# Patient Record
Sex: FEMALE | Race: WHITE | NOT HISPANIC OR LATINO | URBAN - METROPOLITAN AREA
[De-identification: names, ages, dates, MRNs, and addresses within clinical notes are randomized per-mention and may not be internally consistent; named-entity substitution may affect disease eponyms.]

---
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
## 2020-01-01 PROCEDURE — 99232 SBSQ HOSP IP/OBS MODERATE 35: CPT

## 2020-01-01 RX ADMIN — Medication 1 MILLIGRAM(S): at 21:44

## 2020-01-01 RX ADMIN — OLANZAPINE 10 MILLIGRAM(S): 15 TABLET, FILM COATED ORAL at 08:22

## 2020-01-01 RX ADMIN — Medication 10 MILLIGRAM(S): at 08:22

## 2020-01-01 RX ADMIN — METFORMIN HYDROCHLORIDE 1000 MILLIGRAM(S): 850 TABLET ORAL at 21:44

## 2020-01-01 RX ADMIN — OLANZAPINE 10 MILLIGRAM(S): 15 TABLET, FILM COATED ORAL at 21:44

## 2020-01-01 RX ADMIN — Medication 1 MILLIGRAM(S): at 08:22

## 2020-01-01 RX ADMIN — Medication 10 MILLIGRAM(S): at 21:44

## 2020-01-01 RX ADMIN — METFORMIN HYDROCHLORIDE 1000 MILLIGRAM(S): 850 TABLET ORAL at 08:22

## 2020-01-02 PROCEDURE — 99231 SBSQ HOSP IP/OBS SF/LOW 25: CPT

## 2020-01-02 RX ORDER — ACETAMINOPHEN 500 MG
650 TABLET ORAL EVERY 6 HOURS
Refills: 0 | Status: DISCONTINUED | OUTPATIENT
Start: 2020-01-02 | End: 2020-01-21

## 2020-01-02 RX ADMIN — Medication 1 MILLIGRAM(S): at 08:36

## 2020-01-02 RX ADMIN — FLUPHENAZINE HYDROCHLORIDE 5 MILLIGRAM(S): 1 TABLET, FILM COATED ORAL at 09:05

## 2020-01-02 RX ADMIN — Medication 650 MILLIGRAM(S): at 09:34

## 2020-01-02 RX ADMIN — Medication 3 MILLIGRAM(S): at 21:18

## 2020-01-02 RX ADMIN — METFORMIN HYDROCHLORIDE 1000 MILLIGRAM(S): 850 TABLET ORAL at 08:36

## 2020-01-02 RX ADMIN — Medication 2 MILLIGRAM(S): at 09:05

## 2020-01-02 RX ADMIN — Medication 10 MILLIGRAM(S): at 20:45

## 2020-01-02 RX ADMIN — Medication 10 MILLIGRAM(S): at 08:36

## 2020-01-02 RX ADMIN — METFORMIN HYDROCHLORIDE 1000 MILLIGRAM(S): 850 TABLET ORAL at 20:45

## 2020-01-02 RX ADMIN — Medication 650 MILLIGRAM(S): at 11:36

## 2020-01-02 RX ADMIN — Medication 1 MILLIGRAM(S): at 20:45

## 2020-01-02 RX ADMIN — OLANZAPINE 10 MILLIGRAM(S): 15 TABLET, FILM COATED ORAL at 08:36

## 2020-01-03 PROCEDURE — 99232 SBSQ HOSP IP/OBS MODERATE 35: CPT

## 2020-01-03 RX ORDER — FLUPHENAZINE HYDROCHLORIDE 1 MG/1
5 TABLET, FILM COATED ORAL EVERY 6 HOURS
Refills: 0 | Status: DISCONTINUED | OUTPATIENT
Start: 2020-01-03 | End: 2020-01-21

## 2020-01-03 RX ORDER — LOXAPINE SUCCINATE 25 MG
10 CAPSULE ORAL THREE TIMES A DAY
Refills: 0 | Status: DISCONTINUED | OUTPATIENT
Start: 2020-01-03 | End: 2020-01-03

## 2020-01-03 RX ORDER — LOXAPINE SUCCINATE 25 MG
10 CAPSULE ORAL THREE TIMES A DAY
Refills: 0 | Status: DISCONTINUED | OUTPATIENT
Start: 2020-01-03 | End: 2020-01-06

## 2020-01-03 RX ORDER — OLANZAPINE 15 MG/1
10 TABLET, FILM COATED ORAL AT BEDTIME
Refills: 0 | Status: DISCONTINUED | OUTPATIENT
Start: 2020-01-03 | End: 2020-01-08

## 2020-01-03 RX ORDER — DIPHENHYDRAMINE HCL 50 MG
50 CAPSULE ORAL EVERY 6 HOURS
Refills: 0 | Status: DISCONTINUED | OUTPATIENT
Start: 2020-01-03 | End: 2020-01-21

## 2020-01-03 RX ADMIN — Medication 2 MILLIGRAM(S): at 15:38

## 2020-01-03 RX ADMIN — Medication 1 MILLIGRAM(S): at 08:41

## 2020-01-03 RX ADMIN — Medication 10 MILLIGRAM(S): at 20:24

## 2020-01-03 RX ADMIN — Medication 10 MILLIGRAM(S): at 09:26

## 2020-01-03 RX ADMIN — Medication 2 MILLIGRAM(S): at 15:54

## 2020-01-03 RX ADMIN — Medication 1 MILLIGRAM(S): at 20:16

## 2020-01-03 RX ADMIN — Medication 2 MILLIGRAM(S): at 10:03

## 2020-01-03 RX ADMIN — METFORMIN HYDROCHLORIDE 1000 MILLIGRAM(S): 850 TABLET ORAL at 08:41

## 2020-01-03 RX ADMIN — FLUPHENAZINE HYDROCHLORIDE 5 MILLIGRAM(S): 1 TABLET, FILM COATED ORAL at 15:54

## 2020-01-03 RX ADMIN — Medication 50 MILLIGRAM(S): at 10:02

## 2020-01-03 RX ADMIN — OLANZAPINE 10 MILLIGRAM(S): 15 TABLET, FILM COATED ORAL at 09:26

## 2020-01-03 RX ADMIN — FLUPHENAZINE HYDROCHLORIDE 5 MILLIGRAM(S): 1 TABLET, FILM COATED ORAL at 10:02

## 2020-01-03 RX ADMIN — Medication 50 MILLIGRAM(S): at 15:54

## 2020-01-03 RX ADMIN — METFORMIN HYDROCHLORIDE 1000 MILLIGRAM(S): 850 TABLET ORAL at 20:24

## 2020-01-04 RX ADMIN — Medication 50 MILLIGRAM(S): at 09:59

## 2020-01-04 RX ADMIN — METFORMIN HYDROCHLORIDE 1000 MILLIGRAM(S): 850 TABLET ORAL at 08:05

## 2020-01-04 RX ADMIN — Medication 1 MILLIGRAM(S): at 20:45

## 2020-01-04 RX ADMIN — Medication 10 MILLIGRAM(S): at 13:29

## 2020-01-04 RX ADMIN — Medication 10 MILLIGRAM(S): at 20:45

## 2020-01-04 RX ADMIN — Medication 10 MILLIGRAM(S): at 09:47

## 2020-01-04 RX ADMIN — Medication 2 MILLIGRAM(S): at 13:29

## 2020-01-04 RX ADMIN — OLANZAPINE 10 MILLIGRAM(S): 15 TABLET, FILM COATED ORAL at 20:45

## 2020-01-04 RX ADMIN — Medication 1 MILLIGRAM(S): at 08:05

## 2020-01-04 RX ADMIN — METFORMIN HYDROCHLORIDE 1000 MILLIGRAM(S): 850 TABLET ORAL at 20:45

## 2020-01-04 RX ADMIN — Medication 2 MILLIGRAM(S): at 09:59

## 2020-01-04 RX ADMIN — FLUPHENAZINE HYDROCHLORIDE 5 MILLIGRAM(S): 1 TABLET, FILM COATED ORAL at 09:59

## 2020-01-05 RX ADMIN — Medication 10 MILLIGRAM(S): at 09:14

## 2020-01-05 RX ADMIN — Medication 1 MILLIGRAM(S): at 20:49

## 2020-01-05 RX ADMIN — METFORMIN HYDROCHLORIDE 1000 MILLIGRAM(S): 850 TABLET ORAL at 09:14

## 2020-01-05 RX ADMIN — OLANZAPINE 10 MILLIGRAM(S): 15 TABLET, FILM COATED ORAL at 20:49

## 2020-01-05 RX ADMIN — Medication 2 MILLIGRAM(S): at 18:02

## 2020-01-05 RX ADMIN — Medication 10 MILLIGRAM(S): at 12:10

## 2020-01-05 RX ADMIN — Medication 10 MILLIGRAM(S): at 20:49

## 2020-01-05 RX ADMIN — Medication 1 MILLIGRAM(S): at 09:14

## 2020-01-05 RX ADMIN — Medication 2 MILLIGRAM(S): at 12:10

## 2020-01-05 RX ADMIN — METFORMIN HYDROCHLORIDE 1000 MILLIGRAM(S): 850 TABLET ORAL at 20:49

## 2020-01-06 PROCEDURE — 99231 SBSQ HOSP IP/OBS SF/LOW 25: CPT

## 2020-01-06 RX ORDER — LOXAPINE SUCCINATE 25 MG
10 CAPSULE ORAL
Refills: 0 | Status: DISCONTINUED | OUTPATIENT
Start: 2020-01-06 | End: 2020-01-10

## 2020-01-06 RX ORDER — LOXAPINE SUCCINATE 25 MG
20 CAPSULE ORAL DAILY
Refills: 0 | Status: DISCONTINUED | OUTPATIENT
Start: 2020-01-07 | End: 2020-01-10

## 2020-01-06 RX ADMIN — Medication 10 MILLIGRAM(S): at 20:30

## 2020-01-06 RX ADMIN — Medication 1 MILLIGRAM(S): at 08:35

## 2020-01-06 RX ADMIN — METFORMIN HYDROCHLORIDE 1000 MILLIGRAM(S): 850 TABLET ORAL at 08:35

## 2020-01-06 RX ADMIN — METFORMIN HYDROCHLORIDE 1000 MILLIGRAM(S): 850 TABLET ORAL at 20:30

## 2020-01-06 RX ADMIN — Medication 2 MILLIGRAM(S): at 12:00

## 2020-01-06 RX ADMIN — Medication 3 MILLIGRAM(S): at 20:50

## 2020-01-06 RX ADMIN — Medication 10 MILLIGRAM(S): at 08:35

## 2020-01-06 RX ADMIN — Medication 10 MILLIGRAM(S): at 12:00

## 2020-01-06 RX ADMIN — OLANZAPINE 10 MILLIGRAM(S): 15 TABLET, FILM COATED ORAL at 20:30

## 2020-01-06 RX ADMIN — Medication 1 MILLIGRAM(S): at 20:30

## 2020-01-07 PROCEDURE — 99232 SBSQ HOSP IP/OBS MODERATE 35: CPT

## 2020-01-07 RX ORDER — BENZTROPINE MESYLATE 1 MG
0.5 TABLET ORAL ONCE
Refills: 0 | Status: COMPLETED | OUTPATIENT
Start: 2020-01-07 | End: 2020-01-07

## 2020-01-07 RX ADMIN — Medication 1 MILLIGRAM(S): at 09:19

## 2020-01-07 RX ADMIN — METFORMIN HYDROCHLORIDE 1000 MILLIGRAM(S): 850 TABLET ORAL at 09:19

## 2020-01-07 RX ADMIN — OLANZAPINE 10 MILLIGRAM(S): 15 TABLET, FILM COATED ORAL at 20:41

## 2020-01-07 RX ADMIN — Medication 10 MILLIGRAM(S): at 20:41

## 2020-01-07 RX ADMIN — Medication 2 MILLIGRAM(S): at 12:36

## 2020-01-07 RX ADMIN — Medication 10 MILLIGRAM(S): at 12:36

## 2020-01-07 RX ADMIN — Medication 400 MILLIGRAM(S): at 17:45

## 2020-01-07 RX ADMIN — Medication 400 MILLIGRAM(S): at 17:16

## 2020-01-07 RX ADMIN — Medication 20 MILLIGRAM(S): at 09:19

## 2020-01-07 RX ADMIN — Medication 0.5 MILLIGRAM(S): at 18:48

## 2020-01-07 RX ADMIN — METFORMIN HYDROCHLORIDE 1000 MILLIGRAM(S): 850 TABLET ORAL at 20:41

## 2020-01-07 RX ADMIN — Medication 1 MILLIGRAM(S): at 20:41

## 2020-01-07 RX ADMIN — Medication 3 MILLIGRAM(S): at 21:10

## 2020-01-08 PROCEDURE — 99231 SBSQ HOSP IP/OBS SF/LOW 25: CPT

## 2020-01-08 RX ORDER — OLANZAPINE 15 MG/1
5 TABLET, FILM COATED ORAL AT BEDTIME
Refills: 0 | Status: DISCONTINUED | OUTPATIENT
Start: 2020-01-08 | End: 2020-01-10

## 2020-01-08 RX ADMIN — Medication 1 MILLIGRAM(S): at 20:22

## 2020-01-08 RX ADMIN — Medication 10 MILLIGRAM(S): at 20:39

## 2020-01-08 RX ADMIN — Medication 2 MILLIGRAM(S): at 12:30

## 2020-01-08 RX ADMIN — Medication 50 MILLIGRAM(S): at 16:49

## 2020-01-08 RX ADMIN — METFORMIN HYDROCHLORIDE 1000 MILLIGRAM(S): 850 TABLET ORAL at 20:39

## 2020-01-08 RX ADMIN — Medication 1 MILLIGRAM(S): at 08:41

## 2020-01-08 RX ADMIN — Medication 2 MILLIGRAM(S): at 16:48

## 2020-01-08 RX ADMIN — Medication 10 MILLIGRAM(S): at 12:30

## 2020-01-08 RX ADMIN — METFORMIN HYDROCHLORIDE 1000 MILLIGRAM(S): 850 TABLET ORAL at 08:42

## 2020-01-08 RX ADMIN — OLANZAPINE 5 MILLIGRAM(S): 15 TABLET, FILM COATED ORAL at 20:39

## 2020-01-08 RX ADMIN — Medication 20 MILLIGRAM(S): at 08:42

## 2020-01-08 RX ADMIN — FLUPHENAZINE HYDROCHLORIDE 5 MILLIGRAM(S): 1 TABLET, FILM COATED ORAL at 16:48

## 2020-01-09 PROCEDURE — 99231 SBSQ HOSP IP/OBS SF/LOW 25: CPT

## 2020-01-09 RX ADMIN — Medication 20 MILLIGRAM(S): at 08:28

## 2020-01-09 RX ADMIN — Medication 10 MILLIGRAM(S): at 14:17

## 2020-01-09 RX ADMIN — Medication 50 MILLIGRAM(S): at 15:33

## 2020-01-09 RX ADMIN — Medication 1 MILLIGRAM(S): at 08:28

## 2020-01-09 RX ADMIN — FLUPHENAZINE HYDROCHLORIDE 5 MILLIGRAM(S): 1 TABLET, FILM COATED ORAL at 15:33

## 2020-01-09 RX ADMIN — Medication 10 MILLIGRAM(S): at 21:23

## 2020-01-09 RX ADMIN — OLANZAPINE 5 MILLIGRAM(S): 15 TABLET, FILM COATED ORAL at 21:23

## 2020-01-09 RX ADMIN — Medication 2 MILLIGRAM(S): at 14:17

## 2020-01-09 RX ADMIN — METFORMIN HYDROCHLORIDE 1000 MILLIGRAM(S): 850 TABLET ORAL at 08:28

## 2020-01-09 RX ADMIN — METFORMIN HYDROCHLORIDE 1000 MILLIGRAM(S): 850 TABLET ORAL at 21:23

## 2020-01-09 RX ADMIN — Medication 1 MILLIGRAM(S): at 21:23

## 2020-01-10 PROCEDURE — 99231 SBSQ HOSP IP/OBS SF/LOW 25: CPT

## 2020-01-10 RX ORDER — LOXAPINE SUCCINATE 25 MG
10 CAPSULE ORAL DAILY
Refills: 0 | Status: DISCONTINUED | OUTPATIENT
Start: 2020-01-10 | End: 2020-01-16

## 2020-01-10 RX ORDER — LOXAPINE SUCCINATE 25 MG
20 CAPSULE ORAL
Refills: 0 | Status: COMPLETED | OUTPATIENT
Start: 2020-01-10 | End: 2020-01-16

## 2020-01-10 RX ADMIN — Medication 10 MILLIGRAM(S): at 13:05

## 2020-01-10 RX ADMIN — METFORMIN HYDROCHLORIDE 1000 MILLIGRAM(S): 850 TABLET ORAL at 09:52

## 2020-01-10 RX ADMIN — Medication 2 MILLIGRAM(S): at 13:05

## 2020-01-10 RX ADMIN — Medication 20 MILLIGRAM(S): at 20:37

## 2020-01-10 RX ADMIN — Medication 1 MILLIGRAM(S): at 20:37

## 2020-01-10 RX ADMIN — Medication 20 MILLIGRAM(S): at 09:52

## 2020-01-10 RX ADMIN — METFORMIN HYDROCHLORIDE 1000 MILLIGRAM(S): 850 TABLET ORAL at 20:37

## 2020-01-10 RX ADMIN — Medication 1 MILLIGRAM(S): at 09:52

## 2020-01-11 RX ADMIN — Medication 3 MILLIGRAM(S): at 20:40

## 2020-01-11 RX ADMIN — Medication 3 MILLIGRAM(S): at 00:50

## 2020-01-11 RX ADMIN — Medication 10 MILLIGRAM(S): at 14:41

## 2020-01-11 RX ADMIN — Medication 2 MILLIGRAM(S): at 00:50

## 2020-01-11 RX ADMIN — FLUPHENAZINE HYDROCHLORIDE 5 MILLIGRAM(S): 1 TABLET, FILM COATED ORAL at 00:50

## 2020-01-11 RX ADMIN — Medication 20 MILLIGRAM(S): at 12:05

## 2020-01-11 RX ADMIN — METFORMIN HYDROCHLORIDE 1000 MILLIGRAM(S): 850 TABLET ORAL at 20:37

## 2020-01-11 RX ADMIN — Medication 20 MILLIGRAM(S): at 20:37

## 2020-01-11 RX ADMIN — Medication 2 MILLIGRAM(S): at 14:41

## 2020-01-11 RX ADMIN — METFORMIN HYDROCHLORIDE 1000 MILLIGRAM(S): 850 TABLET ORAL at 12:05

## 2020-01-11 RX ADMIN — Medication 1 MILLIGRAM(S): at 12:05

## 2020-01-11 RX ADMIN — Medication 1 MILLIGRAM(S): at 20:14

## 2020-01-12 RX ADMIN — Medication 10 MILLIGRAM(S): at 14:15

## 2020-01-12 RX ADMIN — METFORMIN HYDROCHLORIDE 1000 MILLIGRAM(S): 850 TABLET ORAL at 09:49

## 2020-01-12 RX ADMIN — Medication 20 MILLIGRAM(S): at 20:25

## 2020-01-12 RX ADMIN — Medication 20 MILLIGRAM(S): at 09:49

## 2020-01-12 RX ADMIN — Medication 50 MILLIGRAM(S): at 20:30

## 2020-01-12 RX ADMIN — Medication 1 MILLIGRAM(S): at 09:49

## 2020-01-12 RX ADMIN — Medication 2 MILLIGRAM(S): at 14:15

## 2020-01-12 RX ADMIN — Medication 3 MILLIGRAM(S): at 21:31

## 2020-01-12 RX ADMIN — Medication 2 MILLIGRAM(S): at 20:31

## 2020-01-12 RX ADMIN — METFORMIN HYDROCHLORIDE 1000 MILLIGRAM(S): 850 TABLET ORAL at 20:25

## 2020-01-12 RX ADMIN — Medication 1 MILLIGRAM(S): at 20:25

## 2020-01-13 PROCEDURE — 99231 SBSQ HOSP IP/OBS SF/LOW 25: CPT

## 2020-01-13 RX ORDER — BENZTROPINE MESYLATE 1 MG
0.5 TABLET ORAL
Refills: 0 | Status: DISCONTINUED | OUTPATIENT
Start: 2020-01-13 | End: 2020-01-21

## 2020-01-13 RX ORDER — DIPHENHYDRAMINE HCL 50 MG
50 CAPSULE ORAL ONCE
Refills: 0 | Status: COMPLETED | OUTPATIENT
Start: 2020-01-13 | End: 2020-01-13

## 2020-01-13 RX ADMIN — Medication 20 MILLIGRAM(S): at 08:27

## 2020-01-13 RX ADMIN — Medication 1 MILLIGRAM(S): at 20:28

## 2020-01-13 RX ADMIN — Medication 10 MILLIGRAM(S): at 12:45

## 2020-01-13 RX ADMIN — Medication 2 MILLIGRAM(S): at 12:45

## 2020-01-13 RX ADMIN — Medication 20 MILLIGRAM(S): at 20:28

## 2020-01-13 RX ADMIN — Medication 3 MILLIGRAM(S): at 20:29

## 2020-01-13 RX ADMIN — METFORMIN HYDROCHLORIDE 1000 MILLIGRAM(S): 850 TABLET ORAL at 08:27

## 2020-01-13 RX ADMIN — Medication 0.5 MILLIGRAM(S): at 20:28

## 2020-01-13 RX ADMIN — Medication 50 MILLIGRAM(S): at 23:43

## 2020-01-13 RX ADMIN — METFORMIN HYDROCHLORIDE 1000 MILLIGRAM(S): 850 TABLET ORAL at 20:28

## 2020-01-13 RX ADMIN — Medication 1 MILLIGRAM(S): at 08:27

## 2020-01-14 PROCEDURE — 99231 SBSQ HOSP IP/OBS SF/LOW 25: CPT

## 2020-01-14 RX ADMIN — Medication 2 MILLIGRAM(S): at 12:56

## 2020-01-14 RX ADMIN — Medication 0.5 MILLIGRAM(S): at 20:03

## 2020-01-14 RX ADMIN — Medication 400 MILLIGRAM(S): at 12:30

## 2020-01-14 RX ADMIN — METFORMIN HYDROCHLORIDE 1000 MILLIGRAM(S): 850 TABLET ORAL at 09:34

## 2020-01-14 RX ADMIN — Medication 1 MILLIGRAM(S): at 09:34

## 2020-01-14 RX ADMIN — Medication 0.5 MILLIGRAM(S): at 09:34

## 2020-01-14 RX ADMIN — Medication 1 MILLIGRAM(S): at 20:03

## 2020-01-14 RX ADMIN — Medication 400 MILLIGRAM(S): at 11:00

## 2020-01-14 RX ADMIN — Medication 20 MILLIGRAM(S): at 09:34

## 2020-01-14 RX ADMIN — METFORMIN HYDROCHLORIDE 1000 MILLIGRAM(S): 850 TABLET ORAL at 20:03

## 2020-01-14 RX ADMIN — Medication 3 MILLIGRAM(S): at 20:44

## 2020-01-14 RX ADMIN — Medication 10 MILLIGRAM(S): at 12:56

## 2020-01-14 RX ADMIN — Medication 20 MILLIGRAM(S): at 20:03

## 2020-01-15 PROCEDURE — 99231 SBSQ HOSP IP/OBS SF/LOW 25: CPT

## 2020-01-15 RX ADMIN — Medication 3 MILLIGRAM(S): at 21:05

## 2020-01-15 RX ADMIN — METFORMIN HYDROCHLORIDE 1000 MILLIGRAM(S): 850 TABLET ORAL at 20:16

## 2020-01-15 RX ADMIN — Medication 0.5 MILLIGRAM(S): at 20:16

## 2020-01-15 RX ADMIN — Medication 20 MILLIGRAM(S): at 09:38

## 2020-01-15 RX ADMIN — Medication 20 MILLIGRAM(S): at 20:16

## 2020-01-15 RX ADMIN — Medication 1 MILLIGRAM(S): at 20:16

## 2020-01-15 RX ADMIN — Medication 0.5 MILLIGRAM(S): at 09:37

## 2020-01-15 RX ADMIN — METFORMIN HYDROCHLORIDE 1000 MILLIGRAM(S): 850 TABLET ORAL at 09:38

## 2020-01-15 RX ADMIN — Medication 1 MILLIGRAM(S): at 09:37

## 2020-01-16 PROCEDURE — 99232 SBSQ HOSP IP/OBS MODERATE 35: CPT

## 2020-01-16 RX ORDER — LOXAPINE SUCCINATE 25 MG
20 CAPSULE ORAL THREE TIMES A DAY
Refills: 0 | Status: DISCONTINUED | OUTPATIENT
Start: 2020-01-17 | End: 2020-01-21

## 2020-01-16 RX ADMIN — Medication 2 MILLIGRAM(S): at 14:02

## 2020-01-16 RX ADMIN — FLUPHENAZINE HYDROCHLORIDE 5 MILLIGRAM(S): 1 TABLET, FILM COATED ORAL at 15:23

## 2020-01-16 RX ADMIN — METFORMIN HYDROCHLORIDE 1000 MILLIGRAM(S): 850 TABLET ORAL at 08:00

## 2020-01-16 RX ADMIN — METFORMIN HYDROCHLORIDE 1000 MILLIGRAM(S): 850 TABLET ORAL at 21:30

## 2020-01-16 RX ADMIN — Medication 50 MILLIGRAM(S): at 00:02

## 2020-01-16 RX ADMIN — Medication 20 MILLIGRAM(S): at 21:30

## 2020-01-16 RX ADMIN — Medication 2 MILLIGRAM(S): at 02:37

## 2020-01-16 RX ADMIN — Medication 0.5 MILLIGRAM(S): at 21:30

## 2020-01-16 RX ADMIN — Medication 20 MILLIGRAM(S): at 08:00

## 2020-01-16 RX ADMIN — Medication 0.5 MILLIGRAM(S): at 08:00

## 2020-01-16 RX ADMIN — Medication 1 MILLIGRAM(S): at 08:00

## 2020-01-16 RX ADMIN — Medication 2 MILLIGRAM(S): at 15:23

## 2020-01-16 RX ADMIN — Medication 1 MILLIGRAM(S): at 21:30

## 2020-01-16 RX ADMIN — Medication 10 MILLIGRAM(S): at 14:02

## 2020-01-17 LAB
BASOPHILS # BLD AUTO: 0.05 K/UL — SIGNIFICANT CHANGE UP (ref 0–0.2)
BASOPHILS NFR BLD AUTO: 0.5 % — SIGNIFICANT CHANGE UP (ref 0–2)
EOSINOPHIL # BLD AUTO: 0.34 K/UL — SIGNIFICANT CHANGE UP (ref 0–0.5)
EOSINOPHIL NFR BLD AUTO: 3.2 % — SIGNIFICANT CHANGE UP (ref 0–6)
HCT VFR BLD CALC: 40.3 % — SIGNIFICANT CHANGE UP (ref 34.5–45)
HGB BLD-MCNC: 12.8 G/DL — SIGNIFICANT CHANGE UP (ref 11.5–15.5)
IMM GRANULOCYTES NFR BLD AUTO: 0.2 % — SIGNIFICANT CHANGE UP (ref 0–1.5)
LYMPHOCYTES # BLD AUTO: 3.56 K/UL — HIGH (ref 1–3.3)
LYMPHOCYTES # BLD AUTO: 33.7 % — SIGNIFICANT CHANGE UP (ref 13–44)
MCHC RBC-ENTMCNC: 26.9 PG — LOW (ref 27–34)
MCHC RBC-ENTMCNC: 31.8 % — LOW (ref 32–36)
MCV RBC AUTO: 84.7 FL — SIGNIFICANT CHANGE UP (ref 80–100)
MONOCYTES # BLD AUTO: 0.64 K/UL — SIGNIFICANT CHANGE UP (ref 0–0.9)
MONOCYTES NFR BLD AUTO: 6.1 % — SIGNIFICANT CHANGE UP (ref 2–14)
NEUTROPHILS # BLD AUTO: 5.94 K/UL — SIGNIFICANT CHANGE UP (ref 1.8–7.4)
NEUTROPHILS NFR BLD AUTO: 56.3 % — SIGNIFICANT CHANGE UP (ref 43–77)
NRBC # FLD: 0 K/UL — SIGNIFICANT CHANGE UP (ref 0–0)
PLATELET # BLD AUTO: 417 K/UL — HIGH (ref 150–400)
PMV BLD: 9.9 FL — SIGNIFICANT CHANGE UP (ref 7–13)
RBC # BLD: 4.76 M/UL — SIGNIFICANT CHANGE UP (ref 3.8–5.2)
RBC # FLD: 13.1 % — SIGNIFICANT CHANGE UP (ref 10.3–14.5)
WBC # BLD: 10.55 K/UL — HIGH (ref 3.8–10.5)
WBC # FLD AUTO: 10.55 K/UL — HIGH (ref 3.8–10.5)

## 2020-01-17 PROCEDURE — 99231 SBSQ HOSP IP/OBS SF/LOW 25: CPT

## 2020-01-17 RX ORDER — BENZTROPINE MESYLATE 1 MG
1 TABLET ORAL
Qty: 28 | Refills: 0
Start: 2020-01-17 | End: 2020-01-30

## 2020-01-17 RX ORDER — LOXAPINE SUCCINATE 25 MG
2 CAPSULE ORAL
Qty: 84 | Refills: 0
Start: 2020-01-17 | End: 2020-01-30

## 2020-01-17 RX ORDER — PROPRANOLOL HCL 160 MG
1 CAPSULE, EXTENDED RELEASE 24HR ORAL
Qty: 28 | Refills: 0
Start: 2020-01-17 | End: 2020-01-30

## 2020-01-17 RX ADMIN — Medication 20 MILLIGRAM(S): at 13:22

## 2020-01-17 RX ADMIN — METFORMIN HYDROCHLORIDE 1000 MILLIGRAM(S): 850 TABLET ORAL at 08:19

## 2020-01-17 RX ADMIN — Medication 20 MILLIGRAM(S): at 08:19

## 2020-01-17 RX ADMIN — Medication 20 MILLIGRAM(S): at 21:30

## 2020-01-17 RX ADMIN — FLUPHENAZINE HYDROCHLORIDE 5 MILLIGRAM(S): 1 TABLET, FILM COATED ORAL at 09:50

## 2020-01-17 RX ADMIN — Medication 0.5 MILLIGRAM(S): at 21:30

## 2020-01-17 RX ADMIN — FLUPHENAZINE HYDROCHLORIDE 5 MILLIGRAM(S): 1 TABLET, FILM COATED ORAL at 18:55

## 2020-01-17 RX ADMIN — Medication 1 MILLIGRAM(S): at 08:19

## 2020-01-17 RX ADMIN — Medication 50 MILLIGRAM(S): at 18:55

## 2020-01-17 RX ADMIN — Medication 1 MILLIGRAM(S): at 21:30

## 2020-01-17 RX ADMIN — Medication 0.5 MILLIGRAM(S): at 08:19

## 2020-01-17 RX ADMIN — Medication 2 MILLIGRAM(S): at 09:50

## 2020-01-17 RX ADMIN — METFORMIN HYDROCHLORIDE 1000 MILLIGRAM(S): 850 TABLET ORAL at 21:30

## 2020-01-17 RX ADMIN — Medication 1 MILLIGRAM(S): at 13:22

## 2020-01-17 RX ADMIN — Medication 50 MILLIGRAM(S): at 09:51

## 2020-01-17 RX ADMIN — Medication 3 MILLIGRAM(S): at 23:31

## 2020-01-18 RX ADMIN — METFORMIN HYDROCHLORIDE 1000 MILLIGRAM(S): 850 TABLET ORAL at 12:00

## 2020-01-18 RX ADMIN — Medication 20 MILLIGRAM(S): at 11:59

## 2020-01-18 RX ADMIN — Medication 20 MILLIGRAM(S): at 15:39

## 2020-01-18 RX ADMIN — Medication 0.5 MILLIGRAM(S): at 11:59

## 2020-01-18 RX ADMIN — Medication 1 MILLIGRAM(S): at 15:39

## 2020-01-18 RX ADMIN — Medication 1 MILLIGRAM(S): at 12:00

## 2020-01-18 RX ADMIN — Medication 1 MILLIGRAM(S): at 20:45

## 2020-01-18 RX ADMIN — Medication 20 MILLIGRAM(S): at 21:00

## 2020-01-18 RX ADMIN — METFORMIN HYDROCHLORIDE 1000 MILLIGRAM(S): 850 TABLET ORAL at 21:00

## 2020-01-18 RX ADMIN — Medication 0.5 MILLIGRAM(S): at 21:00

## 2020-01-19 RX ADMIN — Medication 50 MILLIGRAM(S): at 18:30

## 2020-01-19 RX ADMIN — METFORMIN HYDROCHLORIDE 1000 MILLIGRAM(S): 850 TABLET ORAL at 21:05

## 2020-01-19 RX ADMIN — Medication 20 MILLIGRAM(S): at 10:41

## 2020-01-19 RX ADMIN — Medication 1 MILLIGRAM(S): at 10:40

## 2020-01-19 RX ADMIN — Medication 20 MILLIGRAM(S): at 21:05

## 2020-01-19 RX ADMIN — Medication 1 MILLIGRAM(S): at 20:05

## 2020-01-19 RX ADMIN — Medication 0.5 MILLIGRAM(S): at 10:40

## 2020-01-19 RX ADMIN — Medication 1 MILLIGRAM(S): at 14:12

## 2020-01-19 RX ADMIN — Medication 0.5 MILLIGRAM(S): at 21:05

## 2020-01-19 RX ADMIN — Medication 20 MILLIGRAM(S): at 14:12

## 2020-01-19 RX ADMIN — METFORMIN HYDROCHLORIDE 1000 MILLIGRAM(S): 850 TABLET ORAL at 10:41

## 2020-01-20 RX ADMIN — METFORMIN HYDROCHLORIDE 1000 MILLIGRAM(S): 850 TABLET ORAL at 20:21

## 2020-01-20 RX ADMIN — Medication 20 MILLIGRAM(S): at 08:03

## 2020-01-20 RX ADMIN — Medication 1 MILLIGRAM(S): at 08:03

## 2020-01-20 RX ADMIN — Medication 3 MILLIGRAM(S): at 21:05

## 2020-01-20 RX ADMIN — Medication 1 MILLIGRAM(S): at 20:21

## 2020-01-20 RX ADMIN — Medication 0.5 MILLIGRAM(S): at 08:03

## 2020-01-20 RX ADMIN — Medication 20 MILLIGRAM(S): at 12:52

## 2020-01-20 RX ADMIN — Medication 1 MILLIGRAM(S): at 12:52

## 2020-01-20 RX ADMIN — Medication 3 MILLIGRAM(S): at 02:00

## 2020-01-20 RX ADMIN — Medication 20 MILLIGRAM(S): at 20:21

## 2020-01-20 RX ADMIN — Medication 0.5 MILLIGRAM(S): at 20:21

## 2020-01-20 RX ADMIN — METFORMIN HYDROCHLORIDE 1000 MILLIGRAM(S): 850 TABLET ORAL at 08:03

## 2020-01-21 VITALS — SYSTOLIC BLOOD PRESSURE: 126 MMHG | HEART RATE: 76 BPM | TEMPERATURE: 99 F | DIASTOLIC BLOOD PRESSURE: 84 MMHG

## 2020-01-21 PROCEDURE — 99238 HOSP IP/OBS DSCHRG MGMT 30/<: CPT

## 2020-01-21 RX ORDER — LOXAPINE SUCCINATE 25 MG
1 CAPSULE ORAL
Qty: 30 | Refills: 0
Start: 2020-01-21 | End: 2020-02-19

## 2020-01-21 RX ORDER — LOXAPINE SUCCINATE 25 MG
1 CAPSULE ORAL
Qty: 14 | Refills: 0
Start: 2020-01-21 | End: 2020-02-03

## 2020-01-21 RX ORDER — METFORMIN HYDROCHLORIDE 850 MG/1
1 TABLET ORAL
Qty: 30 | Refills: 0
Start: 2020-01-21 | End: 2020-02-04

## 2020-01-21 RX ORDER — LOXAPINE SUCCINATE 25 MG
1 CAPSULE ORAL
Qty: 28 | Refills: 0
Start: 2020-01-21 | End: 2020-02-03

## 2020-01-21 RX ADMIN — Medication 0.5 MILLIGRAM(S): at 08:35

## 2020-01-21 RX ADMIN — Medication 1 MILLIGRAM(S): at 12:30

## 2020-01-21 RX ADMIN — Medication 20 MILLIGRAM(S): at 08:35

## 2020-01-21 RX ADMIN — Medication 20 MILLIGRAM(S): at 12:34

## 2020-01-21 RX ADMIN — METFORMIN HYDROCHLORIDE 1000 MILLIGRAM(S): 850 TABLET ORAL at 08:35

## 2020-01-21 RX ADMIN — Medication 1 MILLIGRAM(S): at 08:35

## 2020-07-02 NOTE — PATIENT PROFILE ADULT - NSPROMUTPARTICIPCAREFT_GEN_A_NUR
Keep tape on unless skin burns or itches or increases pain.     Patient verbalized none at this time
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
## 2021-03-26 ENCOUNTER — APPOINTMENT (OUTPATIENT)
Dept: NEUROLOGY | Facility: CLINIC | Age: 21
End: 2021-03-26
Payer: COMMERCIAL

## 2021-03-26 VITALS
HEART RATE: 97 BPM | WEIGHT: 208 LBS | SYSTOLIC BLOOD PRESSURE: 111 MMHG | OXYGEN SATURATION: 97 % | DIASTOLIC BLOOD PRESSURE: 75 MMHG | BODY MASS INDEX: 35.51 KG/M2 | TEMPERATURE: 98.4 F | HEIGHT: 64 IN

## 2021-03-26 DIAGNOSIS — Z78.9 OTHER SPECIFIED HEALTH STATUS: ICD-10-CM

## 2021-03-26 PROCEDURE — 95819 EEG AWAKE AND ASLEEP: CPT

## 2021-03-26 PROCEDURE — 99072 ADDL SUPL MATRL&STAF TM PHE: CPT

## 2021-03-26 PROCEDURE — 99205 OFFICE O/P NEW HI 60 MIN: CPT

## 2021-03-29 ENCOUNTER — APPOINTMENT (OUTPATIENT)
Dept: NEUROLOGY | Facility: CLINIC | Age: 21
End: 2021-03-29

## 2021-04-05 ENCOUNTER — TRANSCRIPTION ENCOUNTER (OUTPATIENT)
Age: 21
End: 2021-04-05

## 2021-04-15 NOTE — DISCUSSION/SUMMARY
[FreeTextEntry1] : Impression:\par 1) neurobehavioral decline from age 14\par 2) paranoia improved\par 3) question of seizures, with behavioral arrests with right hand automatism-sounding movements\par \par Plan:\par 1) rEEG/ambEEG \par 2) repeat serology\par 3) neuropsychology testing

## 2021-04-15 NOTE — HISTORY OF PRESENT ILLNESS
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  They were seen then by Dr. Hernandez, and many antibiotics were used.  \par She required admission to hospital following the treatments.\par \par She recalls feeling normal in high school at age 14, and everything was together.  Then she felt she needed to skip school, and she felt unable to sit through class.\par \par There was a year in [place] in high school and she felt she was doing very well. - grades were good and she felt focused, and there was a breakdown after a breakup, and ended up needing to go to the hospital in 2017 due to just staring off into space.  Initially to medicine then psychiatry.  She was there for 6 weeks, then [facility] a [facility], but never reached emotional stabilization.\par \par There were syncopal events, thought to be a seizure at one point.  Had a short EEG per notes which was unremarkable.\par \par In 2018 Bartonella was diagnosed by Dr. Dunlap in [place].   \par B.Henselae IgG was positive 4/09/18, IgM was negative.\par Oct 2018: Then seen by Dr. Hernandez,   Doxycycline and Bactrim PO course, but did not tolerate well - eyes rolling.\par Then doxy and rifampin for a few months, with GI side-effects.\par \par MRI brain 02-15-18: UBO high right frontal region, small\par rEEG ([facility]): unremarkable.\par 09-17-19: MRI +C, non-enchancing WM lesions high centrum semiovale\par LP requested, but was not performed.\par SPECT suggested.\par \par Started on clozapine.\par Leida reports feeling better, but is still working on getting herself back.  Not motivated.\par Insomnia - seems to toss and turn all night.\par Feels lack of confidence to be 'present' while in college.  Has plans for nursing.\par Mother feels affect may be a bit flat and she might be reserved.  Feels she has only 1 friend.\par Not a lot of interests, outside of exercise.\par \par Still reports blank staring spells.  The right hand sometimes will move on its own, in circles, sometimes with the staring.\par \par Home: with her father, mother and sister, who is 19yo, all in good health.\par

## 2021-04-15 NOTE — PHYSICAL EXAM
[FreeTextEntry1] : General:\par Constitutional:  Sitting comfortably in NAD.\par Psychiatric: well-groomed, appropriate affect, insight/judgment intact\par Ears, Nose, Throat: no abnormalities, mucus membranes moist\par Mallampati:  4/4\par Neck: supple, no lymphadenopathy or nodules palpable\par Neck Circumference:\par Cardiovascular: regular rate and rhythm, normal S1/S2, no murmurs \par Chest: Clear to bases. 	Abdomen: soft, non-tender\par Extremities: no edema, clubbing or cyanosis\par Skin:  no rash or neurocutaneous signs \par \par Cognitive:\par Orientation, language, memory and knowledge screens intact.\par Months forward, slower backwards.\par [id number]\par [id number]...ALIDA Agosto?\par Cranial Nerves:\par II:  disc margins sharp. III/IV/VI: PERRL EOMF No nystagmus\par V1V2V3: Symmetric, VII: Face appears symmetric VIII: Normal to screening, IX/X: Palate Elevates Symmetrical  XI: Trapezius Symmetric  XII: Tongue midline\par Motor:\par Power: 5/5 throughout, tone: normal x 4 limbs, no tremor \par Sensation:\par Intact to light touch. \par \par Coordination/Gait:\par Finger-nose-finger intact, normal rapid-alternating movements.\par Fine motor normal with normal rapid finger taps\par Narrow based gait, tandem forward ok\par Hops well on both feet \par \par Reflexes:\par DTR: 1-2+ symmetric x 4 limbs

## 2021-04-26 ENCOUNTER — FORM ENCOUNTER (OUTPATIENT)
Age: 21
End: 2021-04-26

## 2021-05-05 ENCOUNTER — APPOINTMENT (OUTPATIENT)
Dept: NEUROLOGY | Facility: CLINIC | Age: 21
End: 2021-05-05
Payer: COMMERCIAL

## 2021-05-06 PROCEDURE — 95708 EEG WO VID EA 12-26HR UNMNTR: CPT

## 2021-05-07 ENCOUNTER — APPOINTMENT (OUTPATIENT)
Dept: NEUROLOGY | Facility: CLINIC | Age: 21
End: 2021-05-07
Payer: COMMERCIAL

## 2021-05-07 ENCOUNTER — APPOINTMENT (OUTPATIENT)
Dept: NEUROLOGY | Facility: CLINIC | Age: 21
End: 2021-05-07

## 2021-05-07 VITALS
DIASTOLIC BLOOD PRESSURE: 77 MMHG | TEMPERATURE: 97.4 F | BODY MASS INDEX: 35.51 KG/M2 | HEART RATE: 98 BPM | OXYGEN SATURATION: 98 % | HEIGHT: 64 IN | WEIGHT: 208 LBS | SYSTOLIC BLOOD PRESSURE: 116 MMHG

## 2021-05-07 DIAGNOSIS — B88.2 OTHER ARTHROPOD INFESTATIONS: ICD-10-CM

## 2021-05-07 DIAGNOSIS — F09 UNSPECIFIED MENTAL DISORDER DUE TO KNOWN PHYSIOLOGICAL CONDITION: ICD-10-CM

## 2021-05-07 DIAGNOSIS — R56.9 UNSPECIFIED CONVULSIONS: ICD-10-CM

## 2021-05-07 DIAGNOSIS — F07.89 UNSPECIFIED MENTAL DISORDER DUE TO KNOWN PHYSIOLOGICAL CONDITION: ICD-10-CM

## 2021-05-07 PROCEDURE — 95700 EEG CONT REC W/VID EEG TECH: CPT

## 2021-05-07 PROCEDURE — 95721 EEG PHY/QHP>36<60 HR W/O VID: CPT

## 2021-05-07 PROCEDURE — 99072 ADDL SUPL MATRL&STAF TM PHE: CPT

## 2021-05-07 PROCEDURE — 95708 EEG WO VID EA 12-26HR UNMNTR: CPT

## 2021-05-07 PROCEDURE — 99214 OFFICE O/P EST MOD 30 MIN: CPT

## 2021-05-07 RX ORDER — DESMOPRESSIN ACETATE 0.1 MG/1
0.1 TABLET ORAL DAILY
Refills: 0 | Status: ACTIVE | COMMUNITY

## 2021-05-07 RX ORDER — CLOZAPINE 100 MG/1
100 TABLET ORAL DAILY
Refills: 0 | Status: ACTIVE | COMMUNITY

## 2021-05-07 RX ORDER — PROPRANOLOL HYDROCHLORIDE 10 MG/1
10 TABLET ORAL TWICE DAILY
Refills: 0 | Status: ACTIVE | COMMUNITY

## 2021-05-07 RX ORDER — EMPAGLIFLOZIN AND METFORMIN HYDROCHLORIDE 12.5; 1 MG/1; MG/1
12.5-1 TABLET ORAL
Refills: 0 | Status: ACTIVE | COMMUNITY

## 2021-05-07 RX ORDER — FLUVOXAMINE MALEATE 50 MG/1
50 TABLET ORAL DAILY
Refills: 0 | Status: ACTIVE | COMMUNITY

## 2021-05-07 RX ORDER — DONEPEZIL HYDROCHLORIDE 5 MG/1
5 TABLET ORAL
Refills: 0 | Status: ACTIVE | COMMUNITY

## 2021-05-07 RX ORDER — LAMOTRIGINE 150 MG/1
150 TABLET ORAL DAILY
Refills: 0 | Status: ACTIVE | COMMUNITY

## 2021-05-26 PROBLEM — B88.2 TICK-BORNE DISEASE: Status: ACTIVE | Noted: 2021-03-26

## 2021-05-26 PROBLEM — F09 COGNITIVE AND NEUROBEHAVIORAL DYSFUNCTION: Status: ACTIVE | Noted: 2021-03-26

## 2021-05-26 PROBLEM — R56.9 CONVULSIONS, UNSPECIFIED CONVULSION TYPE: Status: ACTIVE | Noted: 2021-03-26

## 2021-05-26 NOTE — HISTORY OF PRESENT ILLNESS
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  They were seen then by Dr. Hernandez, and many antibiotics were used.  \par \par Last seen with her mom Mar 26, 20\par 21\par Paranoia now improving, but with short periods of anxiety, now more mild.  May be able to distract herself out of them.\par \par Spells of 'spacing out' still occurring, feeling there was one yesterday for about 15 minutes.\par History of VZV and shingles at age 8.\par \par [facility]:\par CH50 >60\par TPO 10\par IL-10 4.3\par IL-2R high 1325.4\par CRP 14.3\par Mycoplasma IgG up.\par \par Leida reports feeling better, sleep is better.  Starting to feel more motivated to return to school etc.\par Feels lack of confidence to be 'present' while in college.  Has plans for nursing.\par Mother feels affect may be a bit flat and she might be reserved.  Feels she has only 1 friend.\par Not a lot of interests, outside of exercise.\par \par Still reports blank staring spells.  The right hand sometimes will move on its own, in circles, sometimes with the staring.\par There were syncopal events, thought to be a seizure at one point.  Had a short EEG per notes which was unremarkable.  Our rEEG and ambEEG were negative.\par \par In 2018 Bartonella was diagnosed by Dr. Dunlap in [place].   \par B.Henselae IgG was positive 4/09/18, IgM was negative.\par Oct 2018: Then seen by Dr. Hernandez,   Doxycycline and Bactrim PO course, but did not tolerate well - eyes rolling.\par Then doxy and rifampin for a few months, with GI side-effects.\par \par MRI brain 02-15-18: UBO high right frontal region, small\par rEEG ([facility]): unremarkable.\par 09-17-19: MRI +C, non-enchancing WM lesions high centrum semiovale\par LP requested, but was not performed.\par SPECT suggested.\par \par

## 2021-05-26 NOTE — PHYSICAL EXAM
[FreeTextEntry1] : General:\par Constitutional:  Sitting comfortably in NAD.\par Psychiatric: well-groomed, appropriate affect, insight/judgment intact, affect flat\par Ears, Nose, Throat: no abnormalities, mucus membranes moist\par Mallampati:  4/4\par Neck: supple, no lymphadenopathy or nodules palpable\par Skin:  no rash or neurocutaneous signs \par \par Cognitive:\par Orientation, language, memory and knowledge screens intact.\par Months forward, slower backwards.\par registration 5/5 difficult words\par 7's [id number]-\par 3's [id number]\par recall 4/5 \par \par Narrow based gait

## 2021-05-26 NOTE — DISCUSSION/SUMMARY
[FreeTextEntry1] : Impression:\par 1) possibly autoimmune encephalitis.\par \par Plan:\par 1) f/u EEG studies (disconnected today)\par 2) since she is doing well, would not necessarily jump to immunotherapy, however preparedness for college may require neuropsychology testing, as processing speed may still be impaired.

## 2021-07-20 NOTE — ED PROVIDER NOTE - CONSTITUTIONAL, MLM
normal... Well appearing, well nourished, awake, alert, and in no apparent distress. The patient is a 32y Female complaining of pregnancy problem.

## 2022-04-14 NOTE — ED ADULT NURSE NOTE - NSIMPLEMENTINTERV_GEN_ALL_ED
Implemented All Universal Safety Interventions:  [place] to call system. Call bell, personal items and telephone within reach. Instruct patient to call for assistance. Room bathroom lighting operational. Non-slip footwear when patient is off stretcher. Physically safe environment: no spills, clutter or unnecessary equipment. Stretcher in lowest position, wheels locked, appropriate side rails in place. 21:12

## 2022-06-03 NOTE — ED BEHAVIORAL HEALTH ASSESSMENT NOTE - NS ED [facility] MED ROS CONSTITUTIONAL SYMPTOMS
S/w mother. Mother is requesting shot record for student to turn into his college. Informed mother that shot record is printed and she can . Mother verbalized understanding.   No complaints

## 2022-11-04 NOTE — ED BEHAVIORAL HEALTH ASSESSMENT NOTE - NS ED B[facility] PLAN ADMIT TO PSYCHIATRY REFERRANT CONTACTED YN
Problem: At Risk for Falls  Goal: # Patient does not fall  Outcome: Outcome Met, Continue evaluating goal progress toward completion  Goal: # Takes action to control fall-related risks  Outcome: Outcome Met, Continue evaluating goal progress toward completion  Goal: # Verbalizes understanding of fall risk/precautions  Description: Document education using the patient education activity  Outcome: Outcome Met, Continue evaluating goal progress toward completion    No falls or injuries. Bed alarm on. Gait unsteady.     Problem: Pain  Goal: #Acceptable pain level achieved/maintained at rest using NRS/Faces  Description: This goal is used for patients who can self-report.  Acceptable means the level is at or below the identified comfort/function goal.  Outcome: Outcome Met, Continue evaluating goal progress toward completion  Goal: # Acceptable pain level achieved/maintained with activity using NRS/Faces  Description: This goal is used for patients who can self-report and are not achieving acceptable pain control during activity.  Outcome: Outcome Met, Continue evaluating goal progress toward completion  Goal: # Verbalizes understanding of pain management  Description: Documented in Patient Education Activity  Outcome: Outcome Met, Continue evaluating goal progress toward completion    Denied pain.      Yes

## 2023-09-18 NOTE — ED ADULT NURSE NOTE - NSIMPLEMENTINTERV_GEN_ALL_ED
3 Implemented All Universal Safety Interventions:  [place] to call system. Call bell, personal items and telephone within reach. Instruct patient to call for assistance. Room bathroom lighting operational. Non-slip footwear when patient is off stretcher. Physically safe environment: no spills, clutter or unnecessary equipment. Stretcher in lowest position, wheels locked, appropriate side rails in place.

## 2024-01-18 NOTE — ED BEHAVIORAL HEALTH NOTE - BEHAVIORAL HEALTH NOTE
C-SSRS Screener     1. Have you ever wished to be dead or wished you could go to sleep and not wake up?  [  ]Yes, [ * ]No, [  ]Unable to Assess  Details _____________________________     2. Have you actually had any thoughts of killing yourself?   [  ]Yes, [ * ]No, [  ]Unable to Assess  Details _____________________________     If answer is “No” for 1 and 2, stop here. If answer is “Yes” to 1 or 2, proceed to 3.     3. Have you been thinking about how you might kill yourself?  [  ]Yes, [  ]No, [  ]Unable to Assess  Details _____________________________     4. Have you had these thoughts and had some intention of acting on them?  [  ]Yes, [  ]No, [  ]Unable to Assess  Details _____________________________     5. Have you started to work out or worked out the details of how to kill yourself? Do you intend to carry out this plan?  [  ]Yes, [  ]No, [  ]Unable to Assess  Details _____________________________     6. Have you ever done anything, started to do anything, or prepared to do anything to end your life? If so, was it in the past 3 months?  [  ]Yes, [  ]No, [  ]Unable to Assess  Details _____________________________        Additional Suicide Risk Factors (select all that apply)  [  ]Access to lethal means including firearms  [  ]Family history of suicide  [  ]Impulsivity  [  ] Current or past mood disorder  [  ] Current or past psychotic disorder  [  ] Current or past PTSD  [  ] Current or past ADHD  [  ] Current or past TBI  [  ] Current or past cluster B personality disorder or traits  [  ] Current or past conduct problems  [  ] Recent onset of current or past psychiatric disorder  [  ] Family history of psychiatric diagnoses requiring hospitalization     Additional Activating Events (select all that apply)  [  ]Perceived burden on family or others  [  ]Current sexual or physical abuse  [  ]Substance intoxication or withdrawal  [  ]Inadequate social supports  [  ]Hopeless about or dissatisfied with current provider or treatment     Additional Protective Factors (select all that apply)  [  ] Future plans  [  ] Uatsdin beliefs  [  ] Beloved pets Post-op instruction [employer]  Dr. Weaver        You may apply an ice pack wrapped in a towel to the sore area. However, do not use ice longer than 10 minutes at time. You will probably have soreness at the site where the endoscope was inserted.    Keep the surgical site clean and dry at all times.    Remove the dressing in 24 hrs and apply a sterile Band-Aid.     Go home and rest. No driving. When resting, changing positions frequently may help reduce stiffness and soreness.     The following day you may resume normal activities providing you listen to you body. Your body will tell you how active or inactive to be. Remember the rule of thumb “If it hurts, don't do it.” Increase your activity level slowly.    Do not drive a motor vehicle, operate machinery or make important decisions for 24 hour if you had sedation.     Resume normal diet and medications. Resume any blood thinners 24 hours after procedure.    Call your physician if you experience any of the following symptoms: Fever, redness and or swelling at the site, nausea and vomiting, headache, persistent pain, numbness or tingling of legs and/or feet, and bowel or bladder problems.     You may experience increased pain during this time for up to 72 hours after the procedure. It can take up to 6-8 weeks for the procedure to be effective.    Follow up with Dr. Weaver or Dr. Gillette within 3 weeks of the procedure for a post-procedure check-up.         Infection After Surgery: Care Instructions  Overview  After surgery, an infection is always possible. It doesn't mean that the surgery didn't go well.  Because an infection can be serious, your doctor has taken steps to manage it.  Your doctor checked the infection and cleaned it if necessary. Your doctor may have made an opening in the area so that the pus can drain out. You may have gauze in the cut so that the area will stay open and keep draining. You may need antibiotics.  You will need

## 2024-01-31 NOTE — ED ADULT NURSE NOTE - CAS ED TRANSFER FORM COMPLETE YN
Requesting her Ambien be sent to [facility].    Rx Refill Note  Requested Prescriptions     Pending Prescriptions Disp Refills    zolpidem (AMBIEN) 10 MG tablet 30 tablet 1     Sig: Take 1 tablet by mouth At Night As Needed for Sleep.      Last office visit with prescribing clinician: 11/14/2023   Last telemedicine visit with prescribing clinician: Visit date not found   Next office visit with prescribing clinician: 2/14/2024                         Would you like a call back once the refill request has been completed: [] Yes [] No    If the office needs to give you a call back, can they leave a voicemail: [] Yes [] No    Rhonda Cuevas MA  01/31/24, 10:13 EST   Yes

## 2024-03-07 NOTE — ED BEHAVIORAL HEALTH ASSESSMENT NOTE - NS ED [facility] REVIEW OF ED CHART AVAILABLE IMAGING REVIEWED
chronic  continue home medications  - allopurinol 100 mg QD chronic  continue home medications  - allopurinol 100 mg QD None available

## 2024-08-09 NOTE — ED ADULT NURSE NOTE - NSFALLRSKPASTHIST_ED_ALL_ED
Aklief Pregnancy And Lactation Text: It is unknown if this medication is safe to use during pregnancy.  It is unknown if this medication is excreted in breast milk.  Breastfeeding women should use the topical cream on the smallest area of the skin for the shortest time needed while breastfeeding.  Do not apply to nipple and areola. Sarecycline Counseling: Patient advised regarding possible photosensitivity and discoloration of the teeth, skin, lips, tongue and gums.  Patient instructed to avoid sunlight, if possible.  When exposed to sunlight, patients should wear protective clothing, sunglasses, and sunscreen.  The patient was instructed to call the office immediately if the following severe adverse effects occur:  hearing changes, easy bruising/bleeding, severe headache, or vision changes.  The patient verbalized understanding of the proper use and possible adverse effects of sarecycline.  All of the patient's questions and concerns were addressed. Tazorac Counseling:  Patient advised that medication is irritating and drying.  Patient may need to apply sparingly and wash off after an hour before eventually leaving it on overnight.  The patient verbalized understanding of the proper use and possible adverse effects of tazorac.  All of the patient's questions and concerns were addressed. Winlevi Pregnancy And Lactation Text: This medication is considered safe during pregnancy and breastfeeding. Use Enhanced Medication Counseling?: No Isotretinoin Pregnancy And Lactation Text: This medication is Pregnancy Category X and is considered extremely dangerous during pregnancy. It is unknown if it is excreted in breast milk. Dapsone Counseling: I discussed with the patient the risks of dapsone including but not limited to hemolytic anemia, agranulocytosis, rashes, methemoglobinemia, kidney failure, peripheral neuropathy, headaches, GI upset, and liver toxicity.  Patients who start dapsone require monitoring including baseline LFTs and weekly CBCs for the first month, then every month thereafter.  The patient verbalized understanding of the proper use and possible adverse effects of dapsone.  All of the patient's questions and concerns were addressed. Spironolactone Counseling: Patient advised regarding risks of diarrhea, abdominal pain, hyperkalemia, birth defects (for female patients), liver toxicity and renal toxicity. The patient may need blood work to monitor liver and kidney function and potassium levels while on therapy. The patient verbalized understanding of the proper use and possible adverse effects of spironolactone.  All of the patient's questions and concerns were addressed. Azelaic Acid Pregnancy And Lactation Text: This medication is considered safe during pregnancy and breast feeding. Topical Clindamycin Counseling: Patient counseled that this medication may cause skin irritation or allergic reactions.  In the event of skin irritation, the patient was advised to reduce the amount of the drug applied or use it less frequently.   The patient verbalized understanding of the proper use and possible adverse effects of clindamycin.  All of the patient's questions and concerns were addressed. Doxycycline Counseling:  Patient counseled regarding possible photosensitivity and increased risk for sunburn.  Patient instructed to avoid sunlight, if possible.  When exposed to sunlight, patients should wear protective clothing, sunglasses, and sunscreen.  The patient was instructed to call the office immediately if the following severe adverse effects occur:  hearing changes, easy bruising/bleeding, severe headache, or vision changes.  The patient verbalized understanding of the proper use and possible adverse effects of doxycycline.  All of the patient's questions and concerns were addressed. Azithromycin Pregnancy And Lactation Text: This medication is considered safe during pregnancy and is also secreted in breast milk. Benzoyl Peroxide Pregnancy And Lactation Text: This medication is Pregnancy Category C. It is unknown if benzoyl peroxide is excreted in breast milk. Topical Sulfur Applications Counseling: Topical Sulfur Counseling: Patient counseled that this medication may cause skin irritation or allergic reactions.  In the event of skin irritation, the patient was advised to reduce the amount of the drug applied or use it less frequently.   The patient verbalized understanding of the proper use and possible adverse effects of topical sulfur application.  All of the patient's questions and concerns were addressed. Tetracycline Counseling: Patient counseled regarding possible photosensitivity and increased risk for sunburn.  Patient instructed to avoid sunlight, if possible.  When exposed to sunlight, patients should wear protective clothing, sunglasses, and sunscreen.  The patient was instructed to call the office immediately if the following severe adverse effects occur:  hearing changes, easy bruising/bleeding, severe headache, or vision changes.  The patient verbalized understanding of the proper use and possible adverse effects of tetracycline.  All of the patient's questions and concerns were addressed. Patient understands to avoid pregnancy while on therapy due to potential birth defects. High Dose Vitamin A Pregnancy And Lactation Text: High dose vitamin A therapy is contraindicated during pregnancy and breast feeding. Bactrim Pregnancy And Lactation Text: This medication is Pregnancy Category D and is known to cause fetal risk.  It is also excreted in breast milk. Minocycline Pregnancy And Lactation Text: This medication is Pregnancy Category D and not consider safe during pregnancy. It is also excreted in breast milk. Topical Retinoid Pregnancy And Lactation Text: This medication is Pregnancy Category C. It is unknown if this medication is excreted in breast milk. Winlevi Counseling:  I discussed with the patient the risks of topical clascoterone including but not limited to erythema, scaling, itching, and stinging. Patient voiced their understanding. Aklief counseling:  Patient advised to apply a pea-sized amount only at bedtime and wait 30 minutes after washing their face before applying.  If too drying, patient may add a non-comedogenic moisturizer.  The most commonly reported side effects including irritation, redness, scaling, dryness, stinging, burning, itching, and increased risk of sunburn.  The patient verbalized understanding of the proper use and possible adverse effects of retinoids.  All of the patient's questions and concerns were addressed. Erythromycin Counseling:  I discussed with the patient the risks of erythromycin including but not limited to GI upset, allergic reaction, drug rash, diarrhea, increase in liver enzymes, and yeast infections. Birth Control Pills Pregnancy And Lactation Text: This medication should be avoided if pregnant and for the first 30 days post-partum. Isotretinoin Counseling: Patient should get monthly blood tests, not donate blood, not drive at night if vision affected, not share medication, and not undergo elective surgery for 6 months after tx completed. Side effects reviewed, pt to contact office should one occur. Tazorac Pregnancy And Lactation Text: This medication is not safe during pregnancy. It is unknown if this medication is excreted in breast milk. Azelaic Acid Counseling: Patient counseled that medicine may cause skin irritation and to avoid applying near the eyes.  In the event of skin irritation, the patient was advised to reduce the amount of the drug applied or use it less frequently.   The patient verbalized understanding of the proper use and possible adverse effects of azelaic acid.  All of the patient's questions and concerns were addressed. Detail Level: Detailed Spironolactone Pregnancy And Lactation Text: This medication can cause feminization of the male fetus and should be avoided during pregnancy. The active metabolite is also found in breast milk. Dapsone Pregnancy And Lactation Text: This medication is Pregnancy Category C and is not considered safe during pregnancy or breast feeding. Azithromycin Counseling:  I discussed with the patient the risks of azithromycin including but not limited to GI upset, allergic reaction, drug rash, diarrhea, and yeast infections. High Dose Vitamin A Counseling: Side effects reviewed, pt to contact office should one occur. Benzoyl Peroxide Counseling: Patient counseled that medicine may cause skin irritation and bleach clothing.  In the event of skin irritation, the patient was advised to reduce the amount of the drug applied or use it less frequently.   The patient verbalized understanding of the proper use and possible adverse effects of benzoyl peroxide.  All of the patient's questions and concerns were addressed. Topical Clindamycin Pregnancy And Lactation Text: This medication is Pregnancy Category B and is considered safe during pregnancy. It is unknown if it is excreted in breast milk. Bactrim Counseling:  I discussed with the patient the risks of sulfa antibiotics including but not limited to GI upset, allergic reaction, drug rash, diarrhea, dizziness, photosensitivity, and yeast infections.  Rarely, more serious reactions can occur including but not limited to aplastic anemia, agranulocytosis, methemoglobinemia, blood dyscrasias, liver or kidney failure, lung infiltrates or desquamative/blistering drug rashes. Doxycycline Pregnancy And Lactation Text: This medication is Pregnancy Category D and not consider safe during pregnancy. It is also excreted in breast milk but is considered safe for shorter treatment courses. Minocycline Counseling: Patient advised regarding possible photosensitivity and discoloration of the teeth, skin, lips, tongue and gums.  Patient instructed to avoid sunlight, if possible.  When exposed to sunlight, patients should wear protective clothing, sunglasses, and sunscreen.  The patient was instructed to call the office immediately if the following severe adverse effects occur:  hearing changes, easy bruising/bleeding, severe headache, or vision changes.  The patient verbalized understanding of the proper use and possible adverse effects of minocycline.  All of the patient's questions and concerns were addressed. Topical Retinoid counseling:  Patient advised to apply a pea-sized amount only at bedtime and wait 30 minutes after washing their face before applying.  If too drying, patient may add a non-comedogenic moisturizer. The patient verbalized understanding of the proper use and possible adverse effects of retinoids.  All of the patient's questions and concerns were addressed. Topical Sulfur Applications Pregnancy And Lactation Text: This medication is Pregnancy Category C and has an unknown safety profile during pregnancy. It is unknown if this topical medication is excreted in breast milk. Birth Control Pills Counseling: Birth Control Pill Counseling: I discussed with the patient the potential side effects of OCPs including but not limited to increased risk of stroke, heart attack, thrombophlebitis, deep venous thrombosis, hepatic adenomas, breast changes, GI upset, headaches, and depression.  The patient verbalized understanding of the proper use and possible adverse effects of OCPs. All of the patient's questions and concerns were addressed. no Erythromycin Pregnancy And Lactation Text: This medication is Pregnancy Category B and is considered safe during pregnancy. It is also excreted in breast milk.